# Patient Record
Sex: FEMALE | Race: WHITE
[De-identification: names, ages, dates, MRNs, and addresses within clinical notes are randomized per-mention and may not be internally consistent; named-entity substitution may affect disease eponyms.]

---

## 2018-10-04 ENCOUNTER — HOSPITAL ENCOUNTER (OUTPATIENT)
Dept: HOSPITAL 25 - OR | Age: 48
Discharge: HOME | End: 2018-10-04
Attending: ORTHOPAEDIC SURGERY
Payer: COMMERCIAL

## 2018-10-04 VITALS — SYSTOLIC BLOOD PRESSURE: 117 MMHG | DIASTOLIC BLOOD PRESSURE: 79 MMHG

## 2018-10-04 DIAGNOSIS — M84.362K: Primary | ICD-10-CM

## 2018-10-04 DIAGNOSIS — F32.9: ICD-10-CM

## 2018-10-04 PROCEDURE — C1713 ANCHOR/SCREW BN/BN,TIS/BN: HCPCS

## 2018-10-04 PROCEDURE — C1776 JOINT DEVICE (IMPLANTABLE): HCPCS

## 2018-10-04 PROCEDURE — 81025 URINE PREGNANCY TEST: CPT

## 2018-10-04 PROCEDURE — 76001: CPT

## 2018-10-04 NOTE — OP
Operative Report - Blank





- Operative Report


Date of Operation: 10/04/18


Note: 


PATIENT: Nhung Rosales





YOB: 1970





DATE OF SURGERY: 10/4/2018





SURGEON: Izaiah Dean MD





ASSISTANT: RAYA Yoo, whos assistance was necessary for positioning, 

retraction, help with instrumentation, and closure.





ANESTHESIOLOGIST: Dr. Hurd





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





OPERATION: Open reduction and internal fixation of left tibial shaft stress 

fracture with iliac crest bone grafting





ANESTHESIA:  Spinal





IMPLANTS: Synthes small fragment 6 hole LC-DCP plate and screws





TOURNIQUET TIME: Less than one hour with a well-padded thigh tourniquet at 

250mmHg





SPECIMENS: none





ESTIMATED BLOOD LOSS: minimal





COMPLICATIONS: none





STATUS: Stable from the operating room to the recovery room and then home





INDICATIONS FOR PROCEDURE:


Nhung has been dealing with pain from a chronic left tibial shaft stress 

fracture.  Both operative and non-operative treatment alternatives were 

reviewed. Further, the nature and risks of surgery were reviewed in careful 

detail. Our discussions regarding the risks of surgery included, but were not 

limited to, infection, wound problems, nerve injury, neuroma, RSD, persistent 

symptoms, blood clot, nonunion, fracture propagation, hardware failure, need 

for further surgery, failure of the surgery, and even the remote chance of 

catastrophic complication, including loss of limb.





DESCRIPTION OF PROCEDURE:


The patient was seen in the preoperative holding unit and informed written 

consent was obtained.  The appropriate extremity was marked. The patient was 

then brought to the operating room and carefully positioned on the operating 

room table. Anesthesia was induced. All bony prominences were padded with great 

care. A well-padded thigh tourniquet was placed. A chlorhexidine based pre-

scrub was performed followed by a chloraprep prep and drape in standard sterile 

fashion. A surgical safety pause was then conducted in which we confirmed the 

appropriate patient, extremity, planned procedure, availability of equipment, 

indication and administration of prophylactic antibiotics, and DVT prophylaxis 

in the form of a compression boot on the non-surgical extremity. 





I began with an Esmarch exsanguination of the limb and inflated the tourniquet.

  I made a longitudinal incision overlying the anterior tibial shaft.  In a 

subperiosteal manner, I elevated the tibialis anterior muscle off the lateral 

aspect of the tibial shaft.  The fracture was easily visualized.  I excised out 

the fibrous tissue with a 15 blade scalpel from the fracture site.  I then used 

a 2 mm dipika with irrigation to freshen up the fracture edges.





I then turned my attention to the iliac crest.  I made a small incision 

overlying the iliac crest and bluntly dissected down to the crest.  I then used 

an 8-gauge bone marrow aspiration needle to harvest two cores of iliac crest 

cancellous bone.  I then aspirated 1 cc of blood and stem cells from the crest.





I then took the bone graft from the iliac crest and packed it into the fracture 

site with a bone tamp.  I then placed a 6 hole LC DCP Synthes small fragment 

plate on the lateral aspect of the tibial crest, centered at the fracture.  I 

then placed 3.5 mm cortical screws in a compression fashion.  Six screws were 

placed.  The wound was then irrigated.  I then placed the crest blood and stem 

cells at the fracture site.





The fascia of the anterior compartment was then sutured back to the tibial 

periosteum to cover the plate.  The wound was then again irrigated and closed 

in a layered fashion utilizing 3-0 Monocryl for the dermal layer and 3-0 

Monocryl on the skin in a subcuticular pattern. A sterile dressing was then 

applied followed by a splint with the ankle in a neutral position. 





The patient was then awakened from anesthesia and transferred to the recovery 

room in stable condition.  There were no complications.  All needle and sponge 

counts were correct at the end of the case.





ATTESTATION: I attest I was present and scrubbed and performed the critical 

portions of the procedure myself.





POSTOPERATIVE PLAN: The plan is to remain nonweightbearing for an anticipated 

duration of 6 weeks.  I will see her back in 2 weeks for a wound check.

## 2018-10-05 NOTE — RAD
INDICATION: Left tibial ORIF, left tibial fracture 



COMPARISONS: September 21, 2018 



TECHNIQUE: Fluoroscopy was provided for a surgical procedure. Total fluoroscopy time is:

3.2 seconds 



FINDINGS: Spot images demonstrate internal fixation of the tibial diaphysis. 



IMPRESSION: FLUOROSCOPY WAS PROVIDED FOR A SURGICAL PROCEDURE



CPT II Codes:

## 2019-08-03 ENCOUNTER — HOSPITAL ENCOUNTER (EMERGENCY)
Dept: HOSPITAL 25 - UCEAST | Age: 49
Discharge: HOME | End: 2019-08-03
Payer: COMMERCIAL

## 2019-08-03 VITALS — DIASTOLIC BLOOD PRESSURE: 61 MMHG | SYSTOLIC BLOOD PRESSURE: 104 MMHG

## 2019-08-03 DIAGNOSIS — R19.7: Primary | ICD-10-CM

## 2019-08-03 PROCEDURE — G0463 HOSPITAL OUTPT CLINIC VISIT: HCPCS

## 2019-08-03 PROCEDURE — 87899 AGENT NOS ASSAY W/OPTIC: CPT

## 2019-08-03 PROCEDURE — 99211 OFF/OP EST MAY X REQ PHY/QHP: CPT

## 2019-08-03 PROCEDURE — 87493 C DIFF AMPLIFIED PROBE: CPT

## 2019-08-03 PROCEDURE — 87328 CRYPTOSPORIDIUM AG IA: CPT

## 2019-08-03 PROCEDURE — 87329 GIARDIA AG IA: CPT

## 2019-08-03 PROCEDURE — 87046 STOOL CULTR AEROBIC BACT EA: CPT

## 2019-08-03 PROCEDURE — 87045 FECES CULTURE AEROBIC BACT: CPT

## 2019-08-03 PROCEDURE — 83630 LACTOFERRIN FECAL (QUAL): CPT

## 2019-08-03 NOTE — UC
Abdominal Pain Female HPI





- HPI Summary


HPI Summary: 





49 year old female with no PMH, no medications presents with loose stools x 

several days.  Patient states ~ 4-5 stools a day, brown, watery.  no 

incontinence.   Denied fever, chills, abdominal pains.  intermittent bloating, 

no flatus.   mild cramping wih defecation, otherwise no abdominal pains.   





no mucous seen in stools, no blood, no tarry stools. 





Denies nauesa. vomiting. urinary changes. 





no abdominal surgery, no prior occurence, no h/o IBS  





Currently taking immodium to relieve symptoms, ~ 1 tab/ day 





on Day 2 pt felt tired, a bit warm- treated with 1 motrin without return of 

symptoms 





Has c-scope scheduled for next friday 











- History of Current Complaint


Chief Complaint: UCGI


Stated Complaint: LOOSE STOOLS


Time Seen by Provider: 08/03/19 08:11


Hx Obtained From: Patient


Hx Last Menstrual Period: 7/13/19


Pregnant?: Yes


Onset/Duration: Sudden Onset, Lasting Days, Still Present


Severity Initially: Moderate


Severity Currently: Moderate


Pain Intensity: 4


Pain Scale Used: 0-10 Numeric


Location: Discrete At: LLQ, Epigastric


Radiates: No


Character: Cramping - with defecation only, mild


Aggravating Factor(s): Nothing


Alleviating Factor(s): Nothing


Associated Signs and Symptoms: Positive: Diarrhea.  Negative: Fever, Cough, 

Chest Pain, Back Pain, Constipation, Blood in Stool, Urinary Symptoms, 

Decreased Appetite, Nausea, Vomiting


Allergies/Adverse Reactions: 


 Allergies











Allergy/AdvReac Type Severity Reaction Status Date / Time


 


nitrofurantoin Allergy  Hives Verified 08/03/19 08:05





[From Macrodantin]     











Home Medications: 


 Home Medications





Loperamide CAP* [Imodium CAP*] 1 tab PO ONCE PRN 08/03/19 [History Confirmed 08/ 03/19]











PMH/Surg Hx/FS Hx/Imm Hx


Previously Healthy: Yes





- Surgical History


Surgical History: Yes


Surgery Procedure, Year, and Place: WISDOM TEETH EXTRACTED- [facility].  [facility] SURGERY TO [facility]ATE 1990'S.  2005 - [facility]  leg surgery





- Family History


Known Family History: Positive: Cardiac Disease - in an aunt (60's), Non-

Contributory


   Negative: Hypertension, Diabetes





- Social History


Alcohol Use: Daily


Alcohol Amount: 1 GLASS OF WINE 4-5 TIMES PER WEEK


Substance Use Type: None


Smoking Status (MU): Never Smoked Tobacco





- Immunization History


Most Recent Influenza Vaccination: 2014


Most Recent Tetanus Shot: up to date





Review of Systems


All Other Systems Reviewed And Are Negative: Yes





Physical Exam


Triage Information Reviewed: Yes


Appearance: Well-Appearing, No Pain Distress, Well-Nourished


Vital Signs: 


 Initial Vital Signs











Temp  98.2 F   08/03/19 08:01


 


Pulse  54   08/03/19 08:01


 


Resp  18   08/03/19 08:01


 


BP  104/61   08/03/19 08:01


 


Pulse Ox  100   08/03/19 08:01











Vital Signs Reviewed: Yes


Eyes: Positive: Conjunctiva Clear


ENT: Positive: Hearing grossly normal


Abdomen Description: Positive: Nontender, No Organomegaly, Soft.  Negative: CVA 

Tenderness (R), CVA Tenderness (L)


Bowel Sounds: Positive: Present, Other: - minimal hyperactive all 4 quads


Neurological Exam: Normal


Psychological Exam: Normal


Skin Exam: Normal





Abd Pain Female Course/Dx





- Course


Course Of Treatment: 





Acute Diarrhea: 


- Please obtain stool culture and return for treatment 


- Continue drinking fluids to prevent dehydration 


- [place] diet 


- Loperamide or over the counter anti-diarrheal medication for > 4-5 stools/ 

day 


- Return or go to ER with increased abdominal pain, fever > 101,  dark/ tarry 

stools, > 6 stools/ day, blood in stools 





Stool tests will return within 24 hours 








- Differential Dx/Diagnosis


Provider Diagnosis: 


 Diarrhea








Discharge





- Sign-Out/Discharge


Documenting (check all that apply): Patient Departure


All imaging exams completed and their final reports reviewed: No Studies





- Discharge Plan


Condition: Good


Disposition: HOME


Patient Education Materials:  Loperamide (By mouth), Acute Diarrhea (ED)


Referrals: 


Homer Rodriguez MD [Primary Care Provider] - 


Additional Instructions: 


- Please obtain stool culture and return for treatment 


- Continue drinking fluids to prevent dehydration 


- [place] diet 


- Loperamide or over the counter anti-diarrheal medication for > 4-5 stools/ 

day 


- Return or go to ER with increased abdominal pain, fever > 101,  dark/ tarry 

stools, > 6 stools/ day, blood in stools 





Stool tests will return within 24 hours 








- Billing Disposition and Condition


Condition: GOOD


Disposition: Home





- Attestation Statements


Provider Attestation: 





Per institutional requirements, I have reviewed the chart, however, I was not 

consulted specifically or made aware of this patient by the  midlevel provider.

  I did not personally evaluate, interact with , or disposition  this patient.

## 2019-08-05 NOTE — UC
- Progress Note


Progress Note: 


Stool results come back from August 4, 2019.





Patient was seen in clinic on August 3, 2019.





Fecal lactoferrin is positive.





The rest of the stool results are pending.





Nursing to call patient to determine their medical condition.  If they are 

continuing to improve or better than continue present treatment and await 

further results.





If the patient is worsening needs to get reevaluated either of primary care 

office or here emergently department based on the severity of the symptoms.





Course/Dx





- Diagnoses


Provider Diagnoses: 


 Diarrhea








Discharge





- Sign-Out/Discharge


Documenting (check all that apply): Patient Departure


All imaging exams completed and their final reports reviewed: No Studies





- Discharge Plan


Condition: Good


Disposition: HOME


Patient Education Materials:  Loperamide (By mouth), Acute Diarrhea (ED)


Referrals: 


Homer Rodriguez MD [Primary Care Provider] - 


Additional Instructions: 


- Please obtain stool culture and return for treatment 


- Continue drinking fluids to prevent dehydration 


- [place] diet 


- Loperamide or over the counter anti-diarrheal medication for > 4-5 stools/ 

day 


- Return or go to ER with increased abdominal pain, fever > 101,  dark/ tarry 

stools, > 6 stools/ day, blood in stools 





Stool tests will return within 24 hours 








- Billing Disposition and Condition


Condition: GOOD


Disposition: Home

## 2019-08-06 NOTE — UC
- Progress Note


Progress Note: 





PLEASE CALL PATIENT.  STOOL STUDIES RETURNED WITH INDETERMINANT 

CRYPTOSPORIDIUM.  THIS IS A NORMALLY SELF-LIMITED PARASITIC INFECTION HOWEVER 

IF HER SYMPTOMS ARE NOT IMPROVING SHE MUST GO TO THE ER FOR FURTHER EVALUATION 

AND TREATMENT.





Course/Dx





- Diagnoses


Provider Diagnoses: 


 Diarrhea








Discharge





- Sign-Out/Discharge


Documenting (check all that apply): Post-Discharge Follow Up


All imaging exams completed and their final reports reviewed: No Studies





- Discharge Plan


Condition: Good


Disposition: HOME


Patient Education Materials:  Loperamide (By mouth), Acute Diarrhea (ED)


Referrals: 


Homer Rodriguez MD [Primary Care Provider] - 


Additional Instructions: 


- Please obtain stool culture and return for treatment 


- Continue drinking fluids to prevent dehydration 


- [place] diet 


- Loperamide or over the counter anti-diarrheal medication for > 4-5 stools/ 

day 


- Return or go to ER with increased abdominal pain, fever > 101,  dark/ tarry 

stools, > 6 stools/ day, blood in stools 





Stool tests will return within 24 hours 








- Billing Disposition and Condition


Condition: GOOD


Disposition: Home